# Patient Record
Sex: FEMALE | Race: OTHER | HISPANIC OR LATINO | Employment: FULL TIME | URBAN - METROPOLITAN AREA
[De-identification: names, ages, dates, MRNs, and addresses within clinical notes are randomized per-mention and may not be internally consistent; named-entity substitution may affect disease eponyms.]

---
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
## 2018-04-25 ENCOUNTER — HOSPITAL ENCOUNTER (EMERGENCY)
Facility: HOSPITAL | Age: 18
Discharge: HOME | End: 2018-04-25
Payer: COMMERCIAL

## 2018-04-25 VITALS
HEART RATE: 106 BPM | WEIGHT: 210 LBS | SYSTOLIC BLOOD PRESSURE: 136 MMHG | DIASTOLIC BLOOD PRESSURE: 63 MMHG | TEMPERATURE: 98 F | OXYGEN SATURATION: 98 % | BODY MASS INDEX: 32.96 KG/M2 | RESPIRATION RATE: 20 BRPM | HEIGHT: 67 IN

## 2018-04-25 DIAGNOSIS — J02.9 PHARYNGITIS: Primary | ICD-10-CM

## 2018-04-25 LAB — S PYO AG THROAT QL: NEGATIVE

## 2018-04-25 PROCEDURE — 36415 COLL VENOUS BLD VENIPUNCTURE: CPT | Performed by: PHYSICIAN ASSISTANT

## 2018-04-25 PROCEDURE — 87430 STREP A AG IA: CPT | Performed by: PHYSICIAN ASSISTANT

## 2018-04-25 PROCEDURE — 86308 HETEROPHILE ANTIBODY SCREEN: CPT | Performed by: PHYSICIAN ASSISTANT

## 2018-04-25 PROCEDURE — 99283 EMERGENCY DEPT VISIT LOW MDM: CPT

## 2018-04-25 PROCEDURE — 87070 CULTURE OTHR SPECIMN AEROBIC: CPT | Performed by: PHYSICIAN ASSISTANT

## 2018-04-25 RX ORDER — AMOXICILLIN AND CLAVULANATE POTASSIUM 875; 125 MG/1; MG/1
1 TABLET, FILM COATED ORAL 2 TIMES DAILY
COMMUNITY
Start: 2018-04-23 | End: 2018-05-03

## 2018-04-25 RX ADMIN — DEXAMETHASONE SODIUM PHOSPHATE 10 MG: 10 INJECTION, SOLUTION INTRAMUSCULAR; INTRAVENOUS at 14:38

## 2018-04-25 NOTE — ED PROVIDER NOTES
History  Chief Complaint   Patient presents with    Sore Throat     seen at [facility] diagnosed with strep throat, given rc for Augmentin BID x 10 days  Concerned because symptoms arent improving, reports spots to throat, fevers & body aches  Pt  Is 21 y o  Female who was seen at [facility] diagnosed with strep throat placed on Augmentin 2 days ago  States still with pain, swelling, fevers and tonsillar exudate  Notes multiple episodes of strep throat, in the past year  Was recommended to follow up with ENT and has yet too  States she has had mono in the past as well  Denies N/V/D, voice changes, difficulty breathing  Does note very painful swallowing and decreased po intake  Prior to Admission Medications   Prescriptions Last Dose Informant Patient Reported? Taking?   amoxicillin-clavulanate (AUGMENTIN) 875-125 mg per tablet   Yes Yes   Sig: Take 1 tablet by mouth 2 (two) times a day      Facility-Administered Medications: None       Past Medical History:   Diagnosis Date    History of strep sore throat        Past Surgical History:   Procedure Laterality Date    NO PAST SURGERIES         History reviewed  No pertinent family history  I have reviewed and agree with the history as documented  Social History   Substance Use Topics    Smoking status: Light Tobacco Smoker     Types: Cigarettes    Smokeless tobacco: Never Used    Alcohol use Yes      Comment: occasional         Review of Systems   Constitutional: Positive for fever  HENT: Positive for sore throat and trouble swallowing  All other systems reviewed and are negative        Physical Exam  ED Triage Vitals [04/25/18 1328]   Temperature Pulse Respirations Blood Pressure SpO2   98 °F (36 7 °C) (!) 106 20 136/63 98 %      Temp Source Heart Rate Source Patient Position - Orthostatic VS BP Location FiO2 (%)   Temporal -- -- -- --      Pain Score       6           Orthostatic Vital Signs  Vitals:    04/25/18 1328   BP: 136/63   Pulse: (!) 106 Physical Exam   Constitutional: She appears well-developed and well-nourished  HENT:   Head: Normocephalic and atraumatic  Mouth/Throat: Mucous membranes are normal  Oropharyngeal exudate, posterior oropharyngeal edema and posterior oropharyngeal erythema present  Tonsils are 2+ on the right  Tonsils are 2+ on the left  Tonsillar exudate  Cardiovascular: Normal rate, regular rhythm and normal heart sounds  Nursing note and vitals reviewed  ED Medications  Medications - No data to display    Diagnostic Studies  Results Reviewed     None                 No orders to display              Procedures  Procedures       Phone Contacts  ED Phone Contact    ED Course  ED Course                                MDM  Number of Diagnoses or Management Options  Pharyngitis: new and requires workup     Amount and/or Complexity of Data Reviewed  Clinical lab tests: ordered and reviewed      CritCare Time    Disposition  Final diagnoses:   None     ED Disposition     None      Follow-up Information    None       Patient's Medications   Discharge Prescriptions    No medications on file     No discharge procedures on file      ED Provider  Electronically Signed by           Ricardo Monet PA-C  05/03/18 2020

## 2018-04-25 NOTE — DISCHARGE INSTRUCTIONS

## 2018-04-26 LAB — HETEROPH AB SER QL: NEGATIVE

## 2018-04-27 LAB — BACTERIA THROAT CULT: NORMAL

## 2018-07-30 ENCOUNTER — HOSPITAL ENCOUNTER (EMERGENCY)
Facility: HOSPITAL | Age: 18
Discharge: HOME | End: 2018-07-30
Attending: EMERGENCY MEDICINE
Payer: COMMERCIAL

## 2018-07-30 VITALS
SYSTOLIC BLOOD PRESSURE: 120 MMHG | HEART RATE: 63 BPM | WEIGHT: 185 LBS | BODY MASS INDEX: 29.03 KG/M2 | RESPIRATION RATE: 16 BRPM | TEMPERATURE: 97.5 F | HEIGHT: 67 IN | DIASTOLIC BLOOD PRESSURE: 63 MMHG | OXYGEN SATURATION: 99 %

## 2018-07-30 DIAGNOSIS — J02.9 PHARYNGITIS: Primary | ICD-10-CM

## 2018-07-30 LAB — S PYO AG THROAT QL: NEGATIVE

## 2018-07-30 PROCEDURE — 87430 STREP A AG IA: CPT | Performed by: PHYSICIAN ASSISTANT

## 2018-07-30 PROCEDURE — 99283 EMERGENCY DEPT VISIT LOW MDM: CPT

## 2018-07-30 RX ADMIN — DEXAMETHASONE SODIUM PHOSPHATE 10 MG: 10 INJECTION, SOLUTION INTRAMUSCULAR; INTRAVENOUS at 17:16

## 2018-07-30 NOTE — ED PROVIDER NOTES
History  Chief Complaint   Patient presents with    Sore Throat     pt reports sore throat that started yesterday  Pt states she took Amoxicillin 1 capsule yesterday and 1 today, given to her by aunt who had meds leftover  22 yo F who presents today for evaluation of sore throat x 2 days  Symptoms started yesterday  She reports pain with swallowing but she is able to tolerate PO  She denies any associated fevers, nasal congestion, cough, ear pain, abd pain, n/v, rashes  No sick contacts  Reports history of strep throat in the past  She took two doses her aunt's old prescription of amoxicillin  None       Past Medical History:   Diagnosis Date    History of strep sore throat        Past Surgical History:   Procedure Laterality Date    NO PAST SURGERIES         History reviewed  No pertinent family history  I have reviewed and agree with the history as documented  Social History   Substance Use Topics    Smoking status: Light Tobacco Smoker     Types: Cigarettes    Smokeless tobacco: Never Used    Alcohol use Yes      Comment: occasional         Review of Systems   Constitutional: Negative for chills and fever  HENT: Positive for sore throat  Negative for congestion, ear discharge, ear pain, rhinorrhea and trouble swallowing  Respiratory: Negative for cough and shortness of breath  Cardiovascular: Negative for chest pain  Gastrointestinal: Negative for abdominal pain, diarrhea, nausea and vomiting  Skin: Negative for rash  Physical Exam  Physical Exam   Constitutional: She is oriented to person, place, and time  Vital signs are normal  She appears well-developed and well-nourished  Non-toxic appearance  She does not have a sickly appearance  She does not appear ill  No distress  Normal phonation  Well appearing   HENT:   Head: Normocephalic and atraumatic     Right Ear: Hearing, tympanic membrane, external ear and ear canal normal    Left Ear: Hearing, tympanic NEUROLOGY PROGRESS NOTE      Background:  34 y.o. male was admitted on 6/17/2017  7:07 AM for Aspiration pneumonia (CMS-HCC)  Aspiration pneumonia (CMS-HCC).  He is being followed by Neurology for status epilepticus.    SUBJECTIVE: Intubated, sedated. No clinical Sz activity.  No Sz reported on last 24 hr EEG by Dr. Bolton.    NEUROLOGICAL EXAM:   NEUROLOGIC:  Limited as the patient is not interactive.  Apparently at    baseline, he just track with his eyes.  He is nonverbal, with a spastic    quadriparesis.    OBJECTIVE:     EEG:   (6/19)  This is an abnormal video EEG recording in the awake and drowsy state(s).  Frequent interictal / independent right and left temporal spikes. FOUR seizures captured during the study. All seizures were several seconds long, some involved secondary generalization. Two seizures appear to have a right temporal and two others a left temporal onset. There is no recovery of mentation (unknown baseline) in between events, in that sense, the patient should be considered in status epilepticus. 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 Clinical and radiological correlation is recommended.    EEG(6/22):  This is an abnormal 24 hrs video EEG recording in the awake and drowsy state(s).  Frequent interictal / independent right and left temporal spikes. The findings increase risk for seizures and suggest underlying areas of cortical irritability, however no seizures were captured during this study. There has been significant improvement when compared to prior studies. Clinical and radiological correlation is recommended.      Harris Bolton MD      MEDICATIONS:  Current Facility-Administered Medications   Medication Dose   • sodium phosphate 15 mmol in 1/2  mL ivpb  15 mmol   • potassium chloride (KLOR-CON) 20 MEQ packet 40 mEq  40 mEq   • cefTRIAXone (ROCEPHIN) 2 g in  mL IVPB  2 g   • metronidazole (FLAGYL) tablet 500 mg  500 mg   • norepinephrine (LEVOPHED) 8 mg in  mL Infusion  0.5-30  mcg/min   • levetiracetam (KEPPRA) 1,500 mg in D5W 100 mL IVPB  1,500 mg   • PHENobarbital solution 60 mg  60 mg   • enoxaparin (LOVENOX) inj 30 mg  30 mg   • 0.9 % NaCl with KCl 20 mEq infusion     • Respiratory Care per Protocol     • senna-docusate (PERICOLACE or SENOKOT S) 8.6-50 MG per tablet 2 Tab  2 Tab    And   • polyethylene glycol/lytes (MIRALAX) PACKET 1 Packet  1 Packet    And   • magnesium hydroxide (MILK OF MAGNESIA) suspension 30 mL  30 mL    And   • bisacodyl (DULCOLAX) suppository 10 mg  10 mg   • chlorhexidine (PERIDEX) 0.12 % solution 15 mL  15 mL   • lidocaine (XYLOCAINE) 1%  injection  1-2 mL   • MD ALERT...Adult ICU Electrolyte Replacement per Pharmacy Protocol     • fentaNYL (SUBLIMAZE) injection 25 mcg  25 mcg    Or   • fentaNYL (SUBLIMAZE) injection 50 mcg  50 mcg    Or   • fentaNYL (SUBLIMAZE) injection 100 mcg  100 mcg   • famotidine (PEPCID) tablet 20 mg  20 mg    Or   • famotidine (PEPCID) injection 20 mg  20 mg   • ipratropium-albuterol (DUONEB) nebulizer solution 3 mL  3 mL   • ipratropium-albuterol (DUONEB) nebulizer solution 3 mL  3 mL   • insulin lispro (HUMALOG) injection 1-6 Units  1-6 Units   • glucose 4 g chewable tablet 16 g  16 g    And   • dextrose 50% (D50W) injection 25 mL  25 mL   • propofol (DIPRIVAN) injection  5-80 mcg/kg/min   • NS infusion 1,632 mL  30 mL/kg   • NS (BOLUS) infusion 1,000 mL  1,000 mL   • ondansetron (ZOFRAN) syringe/vial injection 4 mg  4 mg   • ondansetron (ZOFRAN ODT) dispertab 4 mg  4 mg   • promethazine (PHENERGAN) tablet 12.5-25 mg  12.5-25 mg   • promethazine (PHENERGAN) suppository 12.5-25 mg  12.5-25 mg   • prochlorperazine (COMPAZINE) injection 5-10 mg  5-10 mg   • lorazepam (ATIVAN) tablet 0.5 mg  0.5 mg    Or   • lorazepam (ATIVAN) injection 0.5 mg  0.5 mg   • acetaminophen (TYLENOL) tablet 650 mg  650 mg   • Pain Pump (patient supplied) Device         Blood pressure 128/98, pulse 84, temperature 36.9 °C (98.4 °F), resp. rate 14, height  membrane, external ear and ear canal normal    Nose: Nose normal    Mouth/Throat: Uvula is midline, oropharynx is clear and moist and mucous membranes are normal  No posterior oropharyngeal edema, posterior oropharyngeal erythema or tonsillar abscesses  Tonsils are 2+ on the right  Tonsils are 2+ on the left  No tonsillar exudate  Eyes: Conjunctivae and EOM are normal  Pupils are equal, round, and reactive to light  Neck: Normal range of motion  Neck supple  Cardiovascular: Normal rate, regular rhythm and normal heart sounds  Exam reveals no gallop and no friction rub  No murmur heard  Pulmonary/Chest: Effort normal and breath sounds normal  No respiratory distress  She has no decreased breath sounds  She has no wheezes  She has no rhonchi  She has no rales  Musculoskeletal: Normal range of motion  Lymphadenopathy:     She has no cervical adenopathy  Neurological: She is alert and oriented to person, place, and time  Skin: Skin is warm and dry  Capillary refill takes less than 2 seconds  She is not diaphoretic  Psychiatric: She has a normal mood and affect  Nursing note and vitals reviewed        Vital Signs  ED Triage Vitals [07/30/18 1617]   Temperature Pulse Respirations Blood Pressure SpO2   97 5 °F (36 4 °C) 63 16 120/63 99 %      Temp Source Heart Rate Source Patient Position - Orthostatic VS BP Location FiO2 (%)   Oral Monitor Sitting Right arm --      Pain Score       6           Vitals:    07/30/18 1617   BP: 120/63   Pulse: 63   Patient Position - Orthostatic VS: Sitting       Visual Acuity      ED Medications  Medications   dexamethasone 10 mg/mL oral liquid 10 mg 1 mL (not administered)       Diagnostic Studies  Results Reviewed     Procedure Component Value Units Date/Time    Rapid Strep A Screen Only, Adults [[id number]]  (Normal) Collected:  07/30/18 1649    Lab Status:  Final result Specimen:  Throat from Throat Updated:  07/30/18 1703     Rapid Strep A Screen Negative "1.727 m (5' 7.99\"), weight 60.4 kg (133 lb 2.5 oz), SpO2 97 %.        Recent Labs      06/20/17   0530 06/21/17 0420 06/22/17   0533   WBC  5.7  6.0  8.5   RBC  3.28*  3.47*  3.60*   HEMOGLOBIN  9.5*  10.1*  10.4*   HEMATOCRIT  27.8*  29.1*  30.3*   MCV  84.8  83.9  84.2   MCH  29.0  29.1  28.9   MCHC  34.2  34.7  34.3   RDW  47.8  49.2  49.9   PLATELETCT  144*  170  203   MPV  9.0  9.1  9.6     Recent Labs      06/20/17 0530 06/21/17 0420 06/22/17   0533   SODIUM  128*  134*  138   POTASSIUM  2.7*  2.9*  4.1   CHLORIDE  98  104  109   CO2  22  24  24   GLUCOSE  74  122*  111*   BUN  7*  4*  9       No results found for this or any previous visit.     ASSESSMENT AND PLAN:   A 34-year-old male with status epilepticus. Currently intubated.  No Sz on EEG monitoring.  Will d/c prolonged EEG monitoring.  Con't with Keppra 1500mg BID and  Phenobarb 60md BID.  When possible proceed with SBT and extubation.      " No orders to display              Procedures  Procedures       Phone Contacts  ED Phone Contact    ED Course                               MDM  Number of Diagnoses or Management Options  Pharyngitis: new and requires workup  Diagnosis management comments:   22 yo F c/o sore throat  VSS  Rapid strep negative  She was given decadron in the ED  Advised motrin/tylenol for pain, fluids  Patient was instructed not to take old leftover antibiotics  Advised f/u with PCP and ENT as patient reports history of frequent strep throat  Amount and/or Complexity of Data Reviewed  Clinical lab tests: ordered and reviewed    Patient Progress  Patient progress: stable    CritCare Time    Disposition  Final diagnoses:   Pharyngitis     Time reflects when diagnosis was documented in both MDM as applicable and the Disposition within this note     Time User Action Codes Description Comment    7/30/2018  5:07 PM Bryan Flurry Add [J02 9] Pharyngitis       ED Disposition     ED Disposition Condition Comment    Discharge  [address] [facility] discharge to home/self care  Condition at discharge: Good        Follow-up Information     Follow up With Specialties Details Why Contact Info Additional [address] [facility] Internal Medicine Schedule an appointment as soon as possible for a visit  [facility] [id number]  [address],  Otolaryngology   [address] Emergency Department Emergency Medicine  If symptoms worsen [address] [facility]  [phone] [place] ED, [facility] [facility] , [place], [place], [address]          Patient's Medications    No medications on file     No discharge procedures on file      ED Provider  Electronically Signed by           Liv Huff PA-C  07/30/18 3078

## 2018-07-30 NOTE — DISCHARGE INSTRUCTIONS

## 2018-09-14 ENCOUNTER — ANESTHESIA EVENT (EMERGENCY)
Dept: PERIOP | Facility: HOSPITAL | Age: 18
End: 2018-09-14
Payer: COMMERCIAL

## 2018-09-14 ENCOUNTER — APPOINTMENT (EMERGENCY)
Dept: CT IMAGING | Facility: HOSPITAL | Age: 18
End: 2018-09-14
Payer: COMMERCIAL

## 2018-09-14 ENCOUNTER — APPOINTMENT (EMERGENCY)
Dept: RADIOLOGY | Facility: HOSPITAL | Age: 18
End: 2018-09-14
Payer: COMMERCIAL

## 2018-09-14 ENCOUNTER — HOSPITAL ENCOUNTER (EMERGENCY)
Facility: HOSPITAL | Age: 18
Discharge: HOME | End: 2018-09-14
Attending: EMERGENCY MEDICINE | Admitting: SURGERY
Payer: COMMERCIAL

## 2018-09-14 ENCOUNTER — APPOINTMENT (EMERGENCY)
Dept: ULTRASOUND IMAGING | Facility: HOSPITAL | Age: 18
End: 2018-09-14
Payer: COMMERCIAL

## 2018-09-14 ENCOUNTER — ANESTHESIA (EMERGENCY)
Dept: PERIOP | Facility: HOSPITAL | Age: 18
End: 2018-09-14
Payer: COMMERCIAL

## 2018-09-14 VITALS
HEART RATE: 68 BPM | BODY MASS INDEX: 29.07 KG/M2 | DIASTOLIC BLOOD PRESSURE: 70 MMHG | WEIGHT: 185.19 LBS | RESPIRATION RATE: 18 BRPM | HEIGHT: 67 IN | TEMPERATURE: 98.3 F | SYSTOLIC BLOOD PRESSURE: 126 MMHG | OXYGEN SATURATION: 100 %

## 2018-09-14 DIAGNOSIS — K80.12 CALCULUS OF GALLBLADDER WITH ACUTE AND CHRONIC CHOLECYSTITIS WITHOUT OBSTRUCTION: Primary | ICD-10-CM

## 2018-09-14 DIAGNOSIS — R10.11 RUQ PAIN: ICD-10-CM

## 2018-09-14 DIAGNOSIS — J02.9 PHARYNGITIS: ICD-10-CM

## 2018-09-14 DIAGNOSIS — K80.20 CHOLELITHIASIS: ICD-10-CM

## 2018-09-14 DIAGNOSIS — N39.0 UTI (URINARY TRACT INFECTION): ICD-10-CM

## 2018-09-14 LAB
ALBUMIN SERPL BCP-MCNC: 3.6 G/DL (ref 3.5–5)
ALP SERPL-CCNC: 55 U/L (ref 46–116)
ALT SERPL W P-5'-P-CCNC: 19 U/L (ref 12–78)
ANION GAP SERPL CALCULATED.3IONS-SCNC: 9 MMOL/L (ref 4–13)
AST SERPL W P-5'-P-CCNC: 13 U/L (ref 5–45)
BACTERIA UR QL AUTO: ABNORMAL /HPF
BASOPHILS # BLD AUTO: 0.05 THOUSANDS/ΜL (ref 0–0.1)
BASOPHILS NFR BLD AUTO: 1 % (ref 0–1)
BILIRUB SERPL-MCNC: 0.33 MG/DL (ref 0.2–1)
BILIRUB UR QL STRIP: NEGATIVE
BUN SERPL-MCNC: 13 MG/DL (ref 5–25)
CALCIUM SERPL-MCNC: 8.7 MG/DL (ref 8.3–10.1)
CHLORIDE SERPL-SCNC: 104 MMOL/L (ref 100–108)
CLARITY UR: CLEAR
CO2 SERPL-SCNC: 25 MMOL/L (ref 21–32)
COLOR UR: YELLOW
CREAT SERPL-MCNC: 0.77 MG/DL (ref 0.6–1.3)
EOSINOPHIL # BLD AUTO: 0.1 THOUSAND/ΜL (ref 0–0.61)
EOSINOPHIL NFR BLD AUTO: 1 % (ref 0–6)
ERYTHROCYTE [DISTWIDTH] IN BLOOD BY AUTOMATED COUNT: 12 % (ref 11.6–15.1)
EXT PREG TEST URINE: NORMAL
GFR SERPL CREATININE-BSD FRML MDRD: 112 ML/MIN/1.73SQ M
GLUCOSE SERPL-MCNC: 92 MG/DL (ref 65–140)
GLUCOSE UR STRIP-MCNC: NEGATIVE MG/DL
HCT VFR BLD AUTO: 40.1 % (ref 34.8–46.1)
HGB BLD-MCNC: 13.8 G/DL (ref 11.5–15.4)
HGB UR QL STRIP.AUTO: ABNORMAL
IMM GRANULOCYTES # BLD AUTO: 0.03 THOUSAND/UL (ref 0–0.2)
IMM GRANULOCYTES NFR BLD AUTO: 0 % (ref 0–2)
KETONES UR STRIP-MCNC: NEGATIVE MG/DL
LEUKOCYTE ESTERASE UR QL STRIP: ABNORMAL
LIPASE SERPL-CCNC: 163 U/L (ref 73–393)
LYMPHOCYTES # BLD AUTO: 1.38 THOUSANDS/ΜL (ref 0.6–4.47)
LYMPHOCYTES NFR BLD AUTO: 17 % (ref 14–44)
MCH RBC QN AUTO: 30.5 PG (ref 26.8–34.3)
MCHC RBC AUTO-ENTMCNC: 34.4 G/DL (ref 31.4–37.4)
MCV RBC AUTO: 89 FL (ref 82–98)
MONOCYTES # BLD AUTO: 0.55 THOUSAND/ΜL (ref 0.17–1.22)
MONOCYTES NFR BLD AUTO: 7 % (ref 4–12)
NEUTROPHILS # BLD AUTO: 6.13 THOUSANDS/ΜL (ref 1.85–7.62)
NEUTS SEG NFR BLD AUTO: 74 % (ref 43–75)
NITRITE UR QL STRIP: NEGATIVE
NON-SQ EPI CELLS URNS QL MICRO: ABNORMAL /HPF
NRBC BLD AUTO-RTO: 0 /100 WBCS
PH UR STRIP.AUTO: 6.5 [PH] (ref 4.5–8)
PLATELET # BLD AUTO: 209 THOUSANDS/UL (ref 149–390)
PMV BLD AUTO: 10.3 FL (ref 8.9–12.7)
POTASSIUM SERPL-SCNC: 3.7 MMOL/L (ref 3.5–5.3)
PROT SERPL-MCNC: 7.4 G/DL (ref 6.4–8.2)
PROT UR STRIP-MCNC: NEGATIVE MG/DL
RBC # BLD AUTO: 4.53 MILLION/UL (ref 3.81–5.12)
RBC #/AREA URNS AUTO: ABNORMAL /HPF
SODIUM SERPL-SCNC: 138 MMOL/L (ref 136–145)
SP GR UR STRIP.AUTO: 1.02 (ref 1–1.03)
UROBILINOGEN UR QL STRIP.AUTO: 1 E.U./DL
WBC # BLD AUTO: 8.24 THOUSAND/UL (ref 4.31–10.16)
WBC #/AREA URNS AUTO: ABNORMAL /HPF

## 2018-09-14 PROCEDURE — 85025 COMPLETE CBC W/AUTO DIFF WBC: CPT | Performed by: PHYSICIAN ASSISTANT

## 2018-09-14 PROCEDURE — 81025 URINE PREGNANCY TEST: CPT | Performed by: EMERGENCY MEDICINE

## 2018-09-14 PROCEDURE — 88304 TISSUE EXAM BY PATHOLOGIST: CPT | Performed by: PATHOLOGY

## 2018-09-14 PROCEDURE — 80053 COMPREHEN METABOLIC PANEL: CPT | Performed by: PHYSICIAN ASSISTANT

## 2018-09-14 PROCEDURE — 76705 ECHO EXAM OF ABDOMEN: CPT

## 2018-09-14 PROCEDURE — 47563 LAPARO CHOLECYSTECTOMY/GRAPH: CPT | Performed by: PHYSICIAN ASSISTANT

## 2018-09-14 PROCEDURE — 81001 URINALYSIS AUTO W/SCOPE: CPT

## 2018-09-14 PROCEDURE — 99285 EMERGENCY DEPT VISIT HI MDM: CPT

## 2018-09-14 PROCEDURE — 96360 HYDRATION IV INFUSION INIT: CPT

## 2018-09-14 PROCEDURE — 83690 ASSAY OF LIPASE: CPT | Performed by: PHYSICIAN ASSISTANT

## 2018-09-14 PROCEDURE — 74300 X-RAY BILE DUCTS/PANCREAS: CPT

## 2018-09-14 PROCEDURE — 74177 CT ABD & PELVIS W/CONTRAST: CPT

## 2018-09-14 PROCEDURE — 36415 COLL VENOUS BLD VENIPUNCTURE: CPT | Performed by: PHYSICIAN ASSISTANT

## 2018-09-14 RX ORDER — DEXAMETHASONE SODIUM PHOSPHATE 4 MG/ML
INJECTION, SOLUTION INTRA-ARTICULAR; INTRALESIONAL; INTRAMUSCULAR; INTRAVENOUS; SOFT TISSUE AS NEEDED
Status: DISCONTINUED | OUTPATIENT
Start: 2018-09-14 | End: 2018-09-14 | Stop reason: SURG

## 2018-09-14 RX ORDER — OXYCODONE HYDROCHLORIDE AND ACETAMINOPHEN 5; 325 MG/1; MG/1
1 TABLET ORAL EVERY 4 HOURS PRN
Qty: 15 TABLET | Refills: 0 | Status: SHIPPED | OUTPATIENT
Start: 2018-09-14 | End: 2018-09-24

## 2018-09-14 RX ORDER — ROCURONIUM BROMIDE 10 MG/ML
INJECTION, SOLUTION INTRAVENOUS AS NEEDED
Status: DISCONTINUED | OUTPATIENT
Start: 2018-09-14 | End: 2018-09-14 | Stop reason: SURG

## 2018-09-14 RX ORDER — GLYCOPYRROLATE 0.2 MG/ML
INJECTION INTRAMUSCULAR; INTRAVENOUS AS NEEDED
Status: DISCONTINUED | OUTPATIENT
Start: 2018-09-14 | End: 2018-09-14 | Stop reason: SURG

## 2018-09-14 RX ORDER — PROMETHAZINE HYDROCHLORIDE 25 MG/ML
12.5 INJECTION, SOLUTION INTRAMUSCULAR; INTRAVENOUS EVERY 4 HOURS PRN
Status: DISCONTINUED | OUTPATIENT
Start: 2018-09-14 | End: 2018-09-14 | Stop reason: HOSPADM

## 2018-09-14 RX ORDER — FENTANYL CITRATE 50 UG/ML
INJECTION, SOLUTION INTRAMUSCULAR; INTRAVENOUS AS NEEDED
Status: DISCONTINUED | OUTPATIENT
Start: 2018-09-14 | End: 2018-09-14 | Stop reason: SURG

## 2018-09-14 RX ORDER — BUPIVACAINE HYDROCHLORIDE 5 MG/ML
INJECTION, SOLUTION PERINEURAL AS NEEDED
Status: DISCONTINUED | OUTPATIENT
Start: 2018-09-14 | End: 2018-09-14 | Stop reason: HOSPADM

## 2018-09-14 RX ORDER — OXYCODONE HYDROCHLORIDE AND ACETAMINOPHEN 5; 325 MG/1; MG/1
2 TABLET ORAL EVERY 4 HOURS PRN
Status: DISCONTINUED | OUTPATIENT
Start: 2018-09-14 | End: 2018-09-14 | Stop reason: HOSPADM

## 2018-09-14 RX ORDER — NICOTINE 21 MG/24HR
21 PATCH, TRANSDERMAL 24 HOURS TRANSDERMAL DAILY
Status: DISCONTINUED | OUTPATIENT
Start: 2018-09-14 | End: 2018-09-14 | Stop reason: HOSPADM

## 2018-09-14 RX ORDER — SODIUM CHLORIDE 9 MG/ML
125 INJECTION, SOLUTION INTRAVENOUS CONTINUOUS
Status: DISCONTINUED | OUTPATIENT
Start: 2018-09-14 | End: 2018-09-14 | Stop reason: HOSPADM

## 2018-09-14 RX ORDER — OXYCODONE AND ACETAMINOPHEN 2.5; 325 MG/1; MG/1
1 TABLET ORAL EVERY 4 HOURS PRN
Qty: 15 TABLET | Refills: 0 | Status: SHIPPED | OUTPATIENT
Start: 2018-09-14 | End: 2018-09-14 | Stop reason: HOSPADM

## 2018-09-14 RX ORDER — CEPHALEXIN 250 MG/1
500 CAPSULE ORAL ONCE
Status: COMPLETED | OUTPATIENT
Start: 2018-09-14 | End: 2018-09-14

## 2018-09-14 RX ORDER — ONDANSETRON 2 MG/ML
4 INJECTION INTRAMUSCULAR; INTRAVENOUS EVERY 6 HOURS PRN
Status: DISCONTINUED | OUTPATIENT
Start: 2018-09-14 | End: 2018-09-14 | Stop reason: HOSPADM

## 2018-09-14 RX ORDER — MIDAZOLAM HYDROCHLORIDE 1 MG/ML
INJECTION INTRAMUSCULAR; INTRAVENOUS AS NEEDED
Status: DISCONTINUED | OUTPATIENT
Start: 2018-09-14 | End: 2018-09-14 | Stop reason: SURG

## 2018-09-14 RX ORDER — ONDANSETRON 2 MG/ML
INJECTION INTRAMUSCULAR; INTRAVENOUS AS NEEDED
Status: DISCONTINUED | OUTPATIENT
Start: 2018-09-14 | End: 2018-09-14 | Stop reason: SURG

## 2018-09-14 RX ORDER — MAGNESIUM HYDROXIDE/ALUMINUM HYDROXICE/SIMETHICONE 120; 1200; 1200 MG/30ML; MG/30ML; MG/30ML
15 SUSPENSION ORAL ONCE
Status: COMPLETED | OUTPATIENT
Start: 2018-09-14 | End: 2018-09-14

## 2018-09-14 RX ORDER — PROPOFOL 10 MG/ML
INJECTION, EMULSION INTRAVENOUS AS NEEDED
Status: DISCONTINUED | OUTPATIENT
Start: 2018-09-14 | End: 2018-09-14 | Stop reason: SURG

## 2018-09-14 RX ORDER — FENTANYL CITRATE 50 UG/ML
50 INJECTION, SOLUTION INTRAMUSCULAR; INTRAVENOUS
Status: COMPLETED | OUTPATIENT
Start: 2018-09-14 | End: 2018-09-14

## 2018-09-14 RX ORDER — SUCCINYLCHOLINE CHLORIDE 20 MG/ML
INJECTION INTRAMUSCULAR; INTRAVENOUS AS NEEDED
Status: DISCONTINUED | OUTPATIENT
Start: 2018-09-14 | End: 2018-09-14 | Stop reason: SURG

## 2018-09-14 RX ADMIN — DEXAMETHASONE SODIUM PHOSPHATE 4 MG: 4 INJECTION, SOLUTION INTRAMUSCULAR; INTRAVENOUS at 12:56

## 2018-09-14 RX ADMIN — FENTANYL CITRATE 50 MCG: 50 INJECTION, SOLUTION INTRAMUSCULAR; INTRAVENOUS at 13:02

## 2018-09-14 RX ADMIN — SODIUM CHLORIDE 1000 ML: 0.9 INJECTION, SOLUTION INTRAVENOUS at 07:57

## 2018-09-14 RX ADMIN — ALUMINUM HYDROXIDE, MAGNESIUM HYDROXIDE, AND SIMETHICONE 15 ML: 200; 200; 20 SUSPENSION ORAL at 08:03

## 2018-09-14 RX ADMIN — FENTANYL CITRATE 50 MCG: 50 INJECTION INTRAMUSCULAR; INTRAVENOUS at 14:10

## 2018-09-14 RX ADMIN — IOHEXOL 100 ML: 350 INJECTION, SOLUTION INTRAVENOUS at 08:46

## 2018-09-14 RX ADMIN — MIDAZOLAM 2 MG: 1 INJECTION INTRAMUSCULAR; INTRAVENOUS at 12:32

## 2018-09-14 RX ADMIN — ROCURONIUM BROMIDE 25 MG: 10 INJECTION INTRAVENOUS at 12:36

## 2018-09-14 RX ADMIN — LIDOCAINE HYDROCHLORIDE 15 ML: 20 SOLUTION ORAL; TOPICAL at 08:03

## 2018-09-14 RX ADMIN — PROPOFOL 200 MG: 10 INJECTION, EMULSION INTRAVENOUS at 12:35

## 2018-09-14 RX ADMIN — FENTANYL CITRATE 50 MCG: 50 INJECTION INTRAMUSCULAR; INTRAVENOUS at 14:20

## 2018-09-14 RX ADMIN — SODIUM CHLORIDE 125 ML/HR: 0.9 INJECTION, SOLUTION INTRAVENOUS at 12:18

## 2018-09-14 RX ADMIN — NEOSTIGMINE METHYLSULFATE 3 MG: 1 INJECTION, SOLUTION INTRAMUSCULAR; INTRAVENOUS; SUBCUTANEOUS at 13:28

## 2018-09-14 RX ADMIN — CEPHALEXIN 500 MG: 250 CAPSULE ORAL at 10:44

## 2018-09-14 RX ADMIN — SODIUM CHLORIDE: 0.9 INJECTION, SOLUTION INTRAVENOUS at 12:27

## 2018-09-14 RX ADMIN — GLYCOPYRROLATE 0.4 MG: 0.2 INJECTION, SOLUTION INTRAMUSCULAR; INTRAVENOUS at 13:28

## 2018-09-14 RX ADMIN — FENTANYL CITRATE 50 MCG: 50 INJECTION INTRAMUSCULAR; INTRAVENOUS at 14:00

## 2018-09-14 RX ADMIN — CEFAZOLIN SODIUM 2000 MG: 2 SOLUTION INTRAVENOUS at 12:37

## 2018-09-14 RX ADMIN — FENTANYL CITRATE 100 MCG: 50 INJECTION, SOLUTION INTRAMUSCULAR; INTRAVENOUS at 12:35

## 2018-09-14 RX ADMIN — ONDANSETRON HYDROCHLORIDE 4 MG: 2 INJECTION, SOLUTION INTRAVENOUS at 12:56

## 2018-09-14 RX ADMIN — FENTANYL CITRATE 50 MCG: 50 INJECTION INTRAMUSCULAR; INTRAVENOUS at 13:50

## 2018-09-14 RX ADMIN — FENTANYL CITRATE 50 MCG: 50 INJECTION, SOLUTION INTRAMUSCULAR; INTRAVENOUS at 13:19

## 2018-09-14 RX ADMIN — ROCURONIUM BROMIDE 10 MG: 10 INJECTION INTRAVENOUS at 12:55

## 2018-09-14 RX ADMIN — SODIUM CHLORIDE: 0.9 INJECTION, SOLUTION INTRAVENOUS at 13:39

## 2018-09-14 RX ADMIN — OXYCODONE AND ACETAMINOPHEN 2 TABLET: 5; 325 TABLET ORAL at 15:23

## 2018-09-14 RX ADMIN — SUCCINYLCHOLINE CHLORIDE 5 MG: 20 INJECTION, SOLUTION INTRAMUSCULAR; INTRAVENOUS at 12:34

## 2018-09-14 NOTE — ED NOTES
Prescription for Keflex called into [facility] on [address] [facility] TERESSA Shah  09/14/18 6273

## 2018-09-14 NOTE — DISCHARGE INSTRUCTIONS
[facility] discharge instructions    1  No driving 1 week     2  No strenuous activity for 2 weeks  3   If you have a dressing on your skin, remove it in 2 days and then you may shower over the wound with soap and water  4   Notify the office for development of fevers, chills, worsening pain, loss of appetite  5   Call [phone] with any questions or concerns  Cholecystitis   WHAT YOU SHOULD KNOW:   Cholecystitis is inflammation of your gallbladder  Your gallbladder stores bile, which helps break down the fat that you eat  It also helps remove certain chemicals from your body  You may have a sudden, severe attack (acute cholecystitis) or several mild attacks (chronic cholecystitis)  AFTER YOU LEAVE:   Medicines:   · Pain medicine: You may need medicine to take away or decrease pain  ¨ Learn how to take your medicine  Ask what medicine and how much you should take  Be sure you know how, when, and how often to take it  ¨ Do not wait until the pain is severe before you take your medicine  Tell caregivers if your pain does not decrease  ¨ Pain medicine can make you dizzy or sleepy  Prevent falls by calling someone when you get out of bed or if you need help  · NSAIDs  help decrease swelling and pain or fever  This medicine is available with or without a doctor's order  NSAIDs can cause stomach bleeding or kidney problems in certain people  If you take blood thinner medicine, always ask your healthcare provider if NSAIDs are safe for you  Always read the medicine label and follow directions  · Take your medicine as directed  Call your healthcare provider if you think your medicine is not helping or if you have side effects  Tell him if you are allergic to any medicine  Keep a list of the medicines, vitamins, and herbs you take  Include the amounts, and when and why you take them  Bring the list or the pill bottles to follow-up visits   Carry your medicine list with you in case of an emergency  Follow up with your healthcare provider as directed:  Write down your questions so you remember to ask them during your visits  Eat a variety of healthy foods: This may help you have more energy and heal faster  Healthy foods include fruit, vegetables, whole-grain breads, low-fat dairy products, beans, lean meat, and fish  Ask if you need to be on a special diet  Contact your healthcare provider if:   · You have a fever or chills  · You have nausea or vomiting  · You have decreased appetite  · You have pain when you urinate  · Your skin or eyes turn yellow  · You have questions or concerns about your condition or care  Seek care immediately or call 911 if:   · You have severe pain in your abdomen  · You have chest pain or trouble breathing  · You urinate less than usual   © 2014 [address] is for End User's use only and may not be sold, redistributed or otherwise used for commercial purposes  All illustrations and images included in CareNotes® are the copyrighted property of A D [employer] M , [employer]  or [employer]  The above information is an  only  It is not intended as medical advice for individual conditions or treatments  Talk to your doctor, nurse or pharmacist before following any medical regimen to see if it is safe and effective for you  Oxycodone/Acetaminophen (By mouth)   Acetaminophen (d-sqdg-j-MIN-oh-fen), Oxycodone Hydrochloride (gg-s-MUW-done alexandr-droe-KLOR-mono)  Treats moderate to moderately severe pain  This medicine is a narcotic pain reliever  Brand Name(s): Endocet, Percocet, Primlev, Xartemis XR   There may be other brand names for this medicine  When This Medicine Should Not Be Used: This medicine is not right for everyone  Do not use it if you had an allergic reaction to acetaminophen or oxycodone, or if you have serious breathing problems or paralytic ileus    How to Use This Medicine:   Capsule, Liquid, Tablet, Long Acting Tablet  · Your doctor will tell you how much medicine to use  Do not use more than directed  · An overdose can be dangerous  Follow directions carefully so you do not get too much medicine at one time  · Oral liquid: Measure the oral liquid medicine with a marked measuring spoon, oral syringe, or medicine cup  · Swallow the extended-release tablet whole  Do not crush, break, or chew it  Do not lick or wet the tablet before placing it in your mouth  Do not give this medicine through a feeding tube  · This medicine should come with a Medication Guide  Ask your pharmacist for a copy if you do not have one  · Missed dose: If you miss a dose of this medicine, skip the missed dose and go back to your regular dosing schedule  Do not double doses  · Store the medicine in a closed container at room temperature, away from heat, moisture, and direct light  Ask your pharmacist about the best way to dispose of medicine you do not use  Drugs and Foods to Avoid:   Ask your doctor or pharmacist before using any other medicine, including over-the-counter medicines, vitamins, and herbal products  · Do not use Xartemis XR if you are using or have used an MAO inhibitor in the past 14 days  · Some medicines can affect how this medicine works  Tell your doctor if you are using any of the following:   ¨ Carbamazepine, erythromycin, ketoconazole, lamotrigine, mirtazapine, naltrexone, phenytoin, propranolol, rifampin, ritonavir, tramadol, trazodone, or zidovudine  ¨ Birth control pills  ¨ Diuretic (water pill)  ¨ Medicine to treat depression  ¨ Phenothiazine medicine  ¨ Triptan medicine to treat migraine headaches  · Do not drink alcohol while you are using this medicine  Acetaminophen can damage your liver, and alcohol can increase this risk  Do not take acetaminophen without asking your doctor if you have 3 or more drinks of alcohol every day    · Tell your doctor if you use anything else that makes you sleepy  Some examples are allergy medicine, narcotic pain medicine, and alcohol  Tell your doctor if you are using buprenorphine, butorphanol, nalbuphine, pentazocine, a benzodiazepine, or a muscle relaxer  Warnings While Using This Medicine:   · Tell your doctor if you are pregnant or breastfeeding, or if you have kidney disease, liver disease, heart disease, low blood pressure, breathing problems or lung disease (such as asthma, COPD), thyroid problems, [place] disease, pancreas or gallbladder problems, prostate problems, trouble urinating, or a stomach problems, or a history of head injury or brain damage, seizures, or alcohol or drug abuse  Tell your doctor if you are allergic to codeine  · This medicine may cause the following problems:  ¨ High risk of overdose, which can lead to death  ¨ Respiratory depression (serious breathing problem that can be life-threatening)  ¨ Liver problems  ¨ Serious skin reactions  ¨ Serotonin syndrome (when used with certain medicines)  · This medicine may make you dizzy or drowsy  Do not drive or do anything that could be dangerous until you know how this medicine affects you  Sit or lie down if you feel dizzy  Stand up carefully  · This medicine contains acetaminophen  Read the labels of all other medicines you are using to see if they also contain acetaminophen, or ask your doctor or pharmacist  Mike Mcburney not use more than 4 grams (4,000 milligrams) total of acetaminophen in one day  · This medicine can be habit-forming  Do not use more than your prescribed dose  Call your doctor if you think your medicine is not working  · Do not stop using this medicine suddenly  Your doctor will need to slowly decrease your dose before you stop it completely  · This medicine could cause infertility  Talk with your doctor before using this medicine if you plan to have children  · This medicine may cause constipation, especially with long-term use   Ask your doctor if you should use a laxative to prevent and treat constipation  · Keep all medicine out of the reach of children  Never share your medicine with anyone  Possible Side Effects While Using This Medicine:   Call your doctor right away if you notice any of these side effects:  · Allergic reaction: Itching or hives, swelling in your face or hands, swelling or tingling in your mouth or throat, chest tightness, trouble breathing  · Anxiety, restlessness, fast heartbeat, fever, muscle spasms, twitching, diarrhea, seeing or hearing things that are not there  · Blistering, peeling, red skin rash  · Blue lips, fingernails, or skin  · Dark urine or pale stools, loss of appetite, stomach pain, yellow skin or eyes  · Extreme weakness, shallow breathing, uneven heartbeat, seizures, sweating, or cold or clammy skin  · Severe confusion, lightheadedness, dizziness, or fainting  · Severe constipation, nausea, or vomiting  · Trouble breathing or slow breathing  If you notice these less serious side effects, talk with your doctor:   · Headache  · Mild constipation, nausea, or vomiting  · Mild sleepiness or drowsiness  If you notice other side effects that you think are caused by this medicine, tell your doctor  Call your doctor for medical advice about side effects  You may report side effects to FDA at 4-026-FDA-3186  © 2017 [address] Information is for End User's use only and may not be sold, redistributed or otherwise used for commercial purposes  The above information is an  only  It is not intended as medical advice for individual conditions or treatments  Talk to your doctor, nurse or pharmacist before following any medical regimen to see if it is safe and effective for you  Cigarette Smoking and Your Health   WHAT YOU NEED TO KNOW:   What are the risks to my health if I smoke tobacco?  Nicotine and other chemicals found in tobacco damage every cell in your body   Even if you are a light smoker, you have an increased risk for cancer, heart disease, and lung disease  If you are pregnant or have diabetes, smoking increases your risk for complications  What are the benefits to my health if I stop smoking? · You decrease respiratory symptoms such as coughing, wheezing, and shortness of breath  · You reduce your risk for cancers of the lung, mouth, throat, kidney, bladder, pancreas, stomach, and cervix  If you already have cancer, you increase the benefits of chemotherapy  You also reduce your risk for cancer returning or a second cancer from developing  · You reduce your risk for heart disease, blood clots, heart attack, and stroke  · You reduce your risk for lung infections, and diseases such as pneumonia, asthma, chronic bronchitis, and emphysema  · Your circulation improves  More oxygen can be delivered to your body  If you have diabetes, you lower your risk for complications, such as kidney, artery, and eye diseases  You also lower your risk for nerve damage  Nerve damage can lead to amputations, poor vision, and blindness  · You improve your body's ability to heal and to fight infections  What are the health benefits to others if I stop smoking? Tobacco is harmful to nonsmokers who breathe in your secondhand smoke  The following are ways the health of others around you may improve when you stop smoking:  · You lower the risks for lung cancer and heart disease in nonsmoking adults  · If you are pregnant, you lower the risk for miscarriage, early delivery, low birth weight, and stillbirth  You also lower your baby's risk for SIDS, obesity, developmental delay, and neurobehavioral problems, such as ADHD  · If you have children, you lower their risk for ear infections, colds, pneumonia, bronchitis, and asthma  Where can I find more information and support to stop smoking? · [place]  Phone: 5- 702 - 861-0032  Web Address: www Medium  CARE AGREEMENT:   You have the right to help plan your care  Learn about your health condition and how it may be treated  Discuss treatment options with your caregivers to decide what care you want to receive  You always have the right to refuse treatment  The above information is an  only  It is not intended as medical advice for individual conditions or treatments  Talk to your doctor, nurse or pharmacist before following any medical regimen to see if it is safe and effective for you  © 2017 [address] Information is for End User's use only and may not be sold, redistributed or otherwise used for commercial purposes  All illustrations and images included in CareNotes® are the copyrighted property of [employer] M , Inc  or Renaldo Charlene  How to Stop Smoking   WHAT YOU NEED TO KNOW:   You will improve your health and the health of others around you if you stop smoking  Your risk for heart and lung disease, cancer, stroke, heart attack, and vision problems will also decrease  You can benefit from quitting no matter how long you have smoked  DISCHARGE INSTRUCTIONS:   Prepare to stop smoking:  Nicotine is a highly addictive drug found in cigarettes  Withdrawal symptoms can happen when you stop smoking and make it hard to quit  These include anxiety, depression, irritability, trouble sleeping, and increased appetite  You increase your chances of success if you prepare to quit  · Set a quit date  [place] a date that is within the next 2 weeks  Do not pick a day that you think may be stressful or busy  Write down the day or [place] it on your calender  · Tell friends and family that you plan to quit  Explain that you may have withdrawal symptoms when you try to quit  Ask them to support you  They may be able to encourage you and help reduce your stress to make it easier for you to quit  · Make a list of your reasons for quitting    Put the list somewhere you will see it every day, such as your refrigerator  You can look at the list when you have a craving  · Remove all tobacco and nicotine products from your home, car, and workplace  Also, remove anything else that will tempt you to smoke, such as lighters, matches, or ashtrays  Clean your car, home, and places at work that smell like smoke  The smell of smoke can trigger a craving  · Identify triggers that make you want to smoke  This may include activities, feelings, or people  Also write down 1 way you can deal with each of your triggers  For example, if you want to smoke as soon as you wake up, plan another activity during this time, such as exercise  · Make a plan for how you will quit  Learn about the tools that can help you quit, such as medicine, counseling, or nicotine replacement therapy  Choose at least 2 options to help you quit  Tools to help you stop smoking:   · Counseling  from a trained healthcare provider can provide you with support and skills to quit smoking  The provider will also teach you to manage your withdrawal symptoms and cravings  You may receive counseling from one counselor, in group therapy, or through phone therapy called a quit line  · Nicotine replacement therapy (NRT)  such as nicotine patches, gum, or lozenges may help reduce your nicotine cravings  You may get these without a doctor's order  Do not use e-cigarettes or smokeless tobacco in place of cigarettes or to help you quit  They still contain nicotine  · Prescription medicines  such as nasal sprays or nicotine inhalers may help reduce your withdrawal symptoms  Other medicines may also be used to reduce your urge to smoke  Ask your healthcare provider about these medicines  You may need to start certain medicines 2 weeks before your quit date for them to work well  · Hypnosis  is a practice that helps guide you through thoughts and feelings  Hypnosis may help decrease your cravings and make you more willing to quit  · Acupuncture therapy  uses very thin needles to balance energy channels in the body  This is thought to help decrease cravings and symptoms of nicotine withdrawal      · Support groups  let you talk to others who are trying to quit or have already quit  It may be helpful to speak with others about how they quit  Manage your cravings:   · Avoid situations, people, and places that tempt you to smoke  Go to nonsmoking places, such as libraries or restaurants  Understand what tempts you and try to avoid these things  · Keep your hands busy  Hold things such as a stress ball or pen  · Put candy or toothpicks in your mouth  Keep lollipops, sugarless gum, or toothpicks with you at all times  · Do not have alcohol or caffeine  These drinks may tempt you to smoke  Drink healthy liquids such as water or juice instead  · Reward yourself when you resist your cravings  Rewards will motivate you and help you stay positive  · Do an activity that distracts you from your craving  Examples include going for a walk, exercising, or cleaning  Prevent weight gain after you quit:  You may gain a few pounds after you quit smoking  It is healthier for you to gain a few pounds than to continue to smoke  The following can help you prevent weight gain:  · Eat healthy foods  These include fruits, vegetables, whole-grain breads, low-fat dairy products, beans, lean meats, and fish  Eat healthy snacks, such as low-fat yogurt, if you get hungry between meals  · Drink water before, during, and between meals  This will make your stomach feel full and help prevent you from overeating  Ask your healthcare provider how much liquid to drink each day and which liquids are best for you  · Exercise  Take a walk or do some kind of exercise every day  Ask your healthcare provider what exercise is right for you  This may help reduce your cravings and reduce stress    For support and more information: · [employer]oke[employer]  gov  Phone: 5- 514 - 519-2278  Web Address: www smokefree  gov  © 2017 [address] Information is for End User's use only and may not be sold, redistributed or otherwise used for commercial purposes  All illustrations and images included in CareNotes® are the copyrighted property of [employer]  or Renaldo Chang  The above information is an  only  It is not intended as medical advice for individual conditions or treatments  Talk to your doctor, nurse or pharmacist before following any medical regimen to see if it is safe and effective for you

## 2018-09-14 NOTE — OP NOTE
OPERATIVE REPORT  PATIENT NAME: Rolo Salamanca    :  2000  MRN: [id number]  Pt Location: [facility] OR ROOM 01    SURGERY DATE: 2018    Surgeon(s) and Role:     * Trinity Jean MD - Primary     * Rosmery Hussein PA-C - Assisting  The PA was necessary to provide expert assistance and optimize patient safety in the abscence of a qualified surgical resident  Preop Diagnosis:  Calculus of gallbladder with acute and chronic cholecystitis without obstruction [K80 12]    Post-Op Diagnosis Codes:     * Calculus of gallbladder with acute and chronic cholecystitis without obstruction [K80 12]    Procedure(s) (LRB):  CHOLECYSTECTOMY LAPAROSCOPIC (N/A)  CHOLANGIOGRAM (N/A)    Specimen(s):  ID Type Source Tests Collected by Time Destination   1 : GALLBLADDER Tissue Gallbladder TISSUE EXAM Trinity Jean MD 2018 1311        Estimated Blood Loss:   Minimal    Drains:       Anesthesia Type:   General    Operative Indications:  Calculus of gallbladder with acute and chronic cholecystitis without obstruction [K80 12]  The patient is a 68-year-old female presenting with signs and symptoms of acute calculous cholecystitis for which definitive treatment is now indicated  Operative Findings:  The patient was found to have acute on chronic calculous cholecystitis  Laparoscopic cholecystectomy with intraoperative cholangiogram was performed in the routine fashion  During the course the dissection the critical structures defined  The cystic duct dissected out circumferentially  The cystic artery dissected out circumferentially  The fluoroscopic cholangiogram confirmed the proper anatomy  There is free flow of contrast antegrade and retrograde the common bile duct, no evidence for choledocholithiasis and free flow of contrast into the duodenal sweep  The cholecystectomy completed laparoscopically  The patient tolerated the procedure well  Complications:   None    Procedure and Technique:   The patient was taken to the operating room where they were properly identified, monitored and anesthetized  They received antibiotics perioperatively  Venodyne's were placed prior to the induction of anesthesia for DVT prophylaxis  The abdomen prepped and draped under sterile conditions using aseptic technique  Timeout performed  Skin incised at the umbilicus  Peritoneal cavity entered bluntly with a Sophia clamp  11 mm trocar inserted and pneumoperitoneum established to 15 mmHg  4 quadrants of the abdomen and inspected laparoscopically and no additional pathology was identified  3 additional working ports placed  These were 5 mm ports in the epigastrium and right upper quadrant  The patient placed in reverse Trendelenburg, left side down  Gallbladder grasped at its dome retracted cephalad and to the right  Infundibulum grasped and retracted laterally  [place] defined and skeletonized  Cystic duct dissected out circumferentially  Cystic artery dissected out circumferentially  Cystic artery clipped below and divided above with harmonic emily  A clip placed on the up side of the cystic duct  An nick made in the cystic duct and a cholangiocatheter advanced and secured  Fluoroscopic cholangiogram was performed with the above findings noted  Cholangiocatheter removed  Cystic duct clipped twice on the down side and divided between clips  Gallbladder dissected off the liver with harmonic emily  The gallbladder was placed in an Endobag and delivered through the umbilical trocar site  Pneumoperitoneum reestablished to 15 mmHg  Scope advanced and the right upper quadrant inspected  Good hemostasis found  The procedure completed with removal of all ports  The fascial defect at the umbilicus closed with a figure-of-eight suture of 0 Vicryl  Skin closed with subcuticular 4-0 Monocryl suture  Wounds infiltrated with half percent Marcaine  The wounds dressed   The patient extubated and taken to recovery in stable condition         I was present for the entire procedure    Patient Disposition:  PACU     SIGNATURE: Aries Mckeon MD  DATE: September 14, 2018  TIME: 1:26 PM

## 2018-09-14 NOTE — H&P
H&P Exam - General Surgery   Suellen Newman 19 y o  female MRN: [id number]  Unit/Bed#: ED 09 Encounter: [id number]    Assessment/Plan     Assessment:  The patient is a pleasant 70-year-old female with no significant past medical history presenting to the [facility] with signs and symptoms of acute calculous cholecystitis for which definitive treatment by laparoscopic cholecystectomy is now indicated  Plan:  The technical details of laparoscopic cholecystectomy with intraoperative cholangiogram were explained to the patient as were the options which include no surgery and supportive care  Specific risks related to anesthesia, bleeding, infection, postoperative bile leak and common bile duct injury necessitating additional surgical interventions were explained in clear simple terms  All questions answered to the satisfaction of the patient and informed consent obtained  History of Present Illness     HPI:  Suellen Newman is a 19 y o  female who presents with signs and symptoms of acute calculous cholecystitis  Review of Systems   Constitutional: Positive for activity change and appetite change  HENT: Negative  Eyes: Negative  Respiratory: Negative  Cardiovascular: Negative  Gastrointestinal: Positive for abdominal pain and nausea  Negative for vomiting  Endocrine: Negative  Genitourinary: Negative  Musculoskeletal: Negative  Skin: Negative  Allergic/Immunologic: Negative  Neurological: Negative  Hematological: Negative  Psychiatric/Behavioral: Negative          Historical Information   Past Medical History:   Diagnosis Date    History of strep sore throat      Past Surgical History:   Procedure Laterality Date    NO PAST SURGERIES       Social History   History   Alcohol Use    Yes     Comment: occasional      History   Drug Use No     Comment: hx     History   Smoking Status    Current Every Day Smoker    Packs/day: 0 25    Types: Cigarettes   Smokeless Tobacco    Never Used     Family History: non-contributory    Meds/Allergies   all medications and allergies reviewed  No Known Allergies    Objective   First Vitals:   Blood Pressure: 128/65 (09/14/18 0700)  Pulse: 80 (09/14/18 0700)  Temperature: 98 °F (36 7 °C) (09/14/18 0700)  Temp Source: Oral (09/14/18 0700)  Respirations: 14 (09/14/18 0700)  Height: 5' 7" (170 2 cm) (09/14/18 0700)  Weight - Scale: 84 kg (185 lb 3 oz) (09/14/18 0700)  SpO2: 98 % (09/14/18 0700)    Current Vitals:   Blood Pressure: 105/55 (09/14/18 1045)  Pulse: 72 (09/14/18 1045)  Temperature: 98 1 °F (36 7 °C) (09/14/18 1045)  Temp Source: Oral (09/14/18 1045)  Respirations: 16 (09/14/18 1045)  Height: 5' 7" (170 2 cm) (09/14/18 0700)  Weight - Scale: 84 kg (185 lb 3 oz) (09/14/18 0700)  SpO2: 100 % (09/14/18 1045)      Intake/Output Summary (Last 24 hours) at 09/14/18 1148  Last data filed at 09/14/18 0916   Gross per 24 hour   Intake             1000 ml   Output                0 ml   Net             1000 ml       Invasive Devices     Peripheral Intravenous Line            Peripheral IV 09/14/18 Right Antecubital less than 1 day                Physical Exam   Constitutional: She is oriented to person, place, and time  The patient is a well-nourished well-developed female  She is in no acute distress  She is awake alert and oriented  She is a reliable historian and competent to make decisions regarding her own health care  HENT:   Head: Normocephalic and atraumatic  Eyes: Conjunctivae are normal  Pupils are equal, round, and reactive to light  Neck: Normal range of motion  Neck supple  Cardiovascular: Normal rate and regular rhythm  Pulmonary/Chest: Effort normal and breath sounds normal    Abdominal:   Her abdomen is soft  Rotund  Focally tender to percussion palpation limited to the right upper quadrant with a positive Esquivel sign  Musculoskeletal: Normal range of motion     Neurological: She is alert and oriented to person, place, and time  Skin: Skin is warm and dry  Psychiatric: Her behavior is normal  Judgment and thought content normal        Lab Results: I have personally reviewed pertinent lab results  Imaging: I have personally reviewed pertinent reports  EKG, Pathology, and Other Studies: I have personally reviewed pertinent reports  Code Status: No Order  Advance Directive and Living Will:      Power of :    POLST:      Counseling / Coordination of Care  Total floor / unit time spent today 30 minutes  Greater than 50% of total time was spent with the patient and / or family counseling and / or coordination of care  A description of the counseling / coordination of care: 15

## 2018-09-14 NOTE — ANESTHESIA PREPROCEDURE EVALUATION
Review of Systems/Medical History  Patient summary reviewed  Chart reviewed      Cardiovascular  Negative cardio ROS    Pulmonary  Smoker cigar smoker  , Tobacco cessation counseling given ,        GI/Hepatic      Comment: Cholelithiasis           Endo/Other  Negative endo/other ROS      GYN  Negative gynecology ROS          Hematology  Negative hematology ROS      Musculoskeletal  Negative musculoskeletal ROS        Neurology  Negative neurology ROS      Psychology   Negative psychology ROS              Physical Exam    Airway    Mallampati score: II  TM Distance: >3 FB  Neck ROM: full     Dental   No notable dental hx     Cardiovascular  Comment: Negative ROS, Rhythm: regular, Rate: normal, Cardiovascular exam normal    Pulmonary  Pulmonary exam normal Breath sounds clear to auscultation,     Other Findings        Anesthesia Plan  ASA Score- 2     Anesthesia Type- general with ASA Monitors  Additional Monitors:   Airway Plan: ETT  Plan Factors-    Induction- intravenous  Postoperative Plan- Plan for postoperative opioid use  Planned trial extubation    Informed Consent- Anesthetic plan and risks discussed with patient

## 2018-09-14 NOTE — ED PROVIDER NOTES
History  Chief Complaint   Patient presents with    Abdominal Pain     upper abdominal pain since 0500, "feels like someone is turning my insides", 1 episode of vomiting   Sore Throat     x 2 days     18y  o female with no significant PMH presents to the ER for upper abdominal pain since 05:00 today  Patient denies taking any medication for pain  She describes her pain as twisting and radiating all over her abdomen  She rates her pain 8/10 and states it is constant  Associated symptoms: sore throat, vomiting, frequency and urgency  She denies fever, chills, rhinorrhea, congestion, chest pain, dyspnea, nausea, diarrhea, hematuria, dysuria, weakness or paresthesias  Patient states she has had symptoms like this in the past but they always resolved and were not this bad  History provided by:  Patient   used: No        None       Past Medical History:   Diagnosis Date    History of strep sore throat        Past Surgical History:   Procedure Laterality Date    NO PAST SURGERIES         History reviewed  No pertinent family history  I have reviewed and agree with the history as documented  Social History   Substance Use Topics    Smoking status: Current Every Day Smoker     Packs/day: 0 25     Types: Cigarettes    Smokeless tobacco: Never Used    Alcohol use Yes      Comment: occasional         Review of Systems   Constitutional: Negative for chills and fever  HENT: Positive for sore throat  Negative for congestion, drooling, ear discharge, ear pain, facial swelling and rhinorrhea  Eyes: Negative for redness  Respiratory: Negative for shortness of breath  Cardiovascular: Negative for chest pain  Gastrointestinal: Positive for abdominal pain and vomiting  Negative for diarrhea and nausea  Genitourinary: Positive for frequency and urgency  Negative for dysuria and hematuria  Musculoskeletal: Negative for neck stiffness  Skin: Negative for rash  Allergic/Immunologic: Negative for food allergies  Neurological: Negative for weakness and numbness  Physical Exam  Physical Exam   Constitutional:  Non-toxic appearance  No distress  HENT:   Head: Normocephalic and atraumatic  Right Ear: Tympanic membrane, external ear and ear canal normal  No drainage, swelling or tenderness  No foreign bodies  Tympanic membrane is not erythematous  No hemotympanum  Left Ear: Tympanic membrane, external ear and ear canal normal  No drainage, swelling or tenderness  No foreign bodies  Tympanic membrane is not erythematous  No hemotympanum  Nose: Nose normal    Mouth/Throat: Uvula is midline and mucous membranes are normal  No uvula swelling  Posterior oropharyngeal edema and posterior oropharyngeal erythema present  No tonsillar abscesses  Tonsillar exudate  Neck: Normal range of motion and phonation normal  Neck supple  No tracheal deviation present  Cardiovascular: Normal rate, regular rhythm, S1 normal, S2 normal and normal heart sounds  Exam reveals no gallop and no friction rub  No murmur heard  Pulmonary/Chest: Effort normal and breath sounds normal  No respiratory distress  She has no decreased breath sounds  She has no wheezes  She has no rhonchi  She has no rales  She exhibits no tenderness  Abdominal: Soft  Bowel sounds are normal  She exhibits no distension  There is tenderness in the right upper quadrant and epigastric area  There is no rebound, no guarding and no CVA tenderness  Neurological: She is alert  GCS eye subscore is 4  GCS verbal subscore is 5  GCS motor subscore is 6  Skin: Skin is warm and dry  No rash noted  Psychiatric: She has a normal mood and affect  Nursing note and vitals reviewed        Vital Signs  ED Triage Vitals   Temperature Pulse Respirations Blood Pressure SpO2   09/14/18 0700 09/14/18 0700 09/14/18 0700 09/14/18 0700 09/14/18 0700   98 °F (36 7 °C) 80 14 128/65 98 %      Temp Source Heart Rate Source Patient Position - Orthostatic VS BP Location FiO2 (%)   09/14/18 0700 09/14/18 0700 09/14/18 0900 09/14/18 0700 --   Oral Monitor Lying Right arm       Pain Score       09/14/18 0700       8           Vitals:    09/14/18 0700 09/14/18 0900 09/14/18 1045 09/14/18 1203   BP: 128/65 108/56 105/55 104/59   Pulse: 80 60 72 55   Patient Position - Orthostatic VS:  Lying Lying        Visual Acuity      ED Medications  Medications   sodium chloride 0 9 % infusion ( Intravenous New Bag 9/14/18 1227)   sodium chloride 0 9 % bolus 1,000 mL (0 mL Intravenous Stopped 9/14/18 0916)   aluminum-magnesium hydroxide-simethicone (MYLANTA) 200-200-20 mg/5 mL oral suspension 15 mL (15 mL Oral Given 9/14/18 0803)   lidocaine viscous (XYLOCAINE) 2 % mucosal solution 15 mL (15 mL Swish & Swallow Given 9/14/18 0803)   iohexol (OMNIPAQUE) 350 MG/ML injection (MULTI-DOSE) 100 mL (100 mL Intravenous Given 9/14/18 0846)   cephalexin (KEFLEX) capsule 500 mg (500 mg Oral Given 9/14/18 1044)       Diagnostic Studies  Results Reviewed     Procedure Component Value Units Date/Time    Comprehensive metabolic panel [[id number]] Collected:  09/14/18 0750    Lab Status:  Final result Specimen:  Blood from Arm, Right Updated:  09/14/18 0820     Sodium 138 mmol/L      Potassium 3 7 mmol/L      Chloride 104 mmol/L      CO2 25 mmol/L      ANION GAP 9 mmol/L      BUN 13 mg/dL      Creatinine 0 77 mg/dL      Glucose 92 mg/dL      Calcium 8 7 mg/dL      AST 13 U/L      ALT 19 U/L      Alkaline Phosphatase 55 U/L      Total Protein 7 4 g/dL      Albumin 3 6 g/dL      Total Bilirubin 0 33 mg/dL      eGFR 112 ml/min/1 73sq m     Narrative:         National Kidney Disease Education Program recommendations are as follows:  GFR calculation is accurate only with a steady state creatinine  Chronic Kidney disease less than 60 ml/min/1 73 sq  meters  Kidney failure less than 15 ml/min/1 73 sq  meters      Lipase [[id number]]  (Normal) Collected:  09/14/18 0750    Lab Status:  Final result Specimen:  Blood from Arm, Right Updated:  09/14/18 0820     Lipase 163 u/L     CBC and differential [[id number]] Collected:  09/14/18 0750    Lab Status:  Final result Specimen:  Blood from Arm, Right Updated:  09/14/18 0804     WBC 8 24 Thousand/uL      RBC 4 53 Million/uL      Hemoglobin 13 8 g/dL      Hematocrit 40 1 %      MCV 89 fL      MCH 30 5 pg      MCHC 34 4 g/dL      RDW 12 0 %      MPV 10 3 fL      Platelets 361 Thousands/uL      nRBC 0 /100 WBCs      Neutrophils Relative 74 %      Immat GRANS % 0 %      Lymphocytes Relative 17 %      Monocytes Relative 7 %      Eosinophils Relative 1 %      Basophils Relative 1 %      Neutrophils Absolute 6 13 Thousands/µL      Immature Grans Absolute 0 03 Thousand/uL      Lymphocytes Absolute 1 38 Thousands/µL      Monocytes Absolute 0 55 Thousand/µL      Eosinophils Absolute 0 10 Thousand/µL      Basophils Absolute 0 05 Thousands/µL     Urine Microscopic [[id number]]  (Abnormal) Collected:  09/14/18 0719    Lab Status:  Final result Specimen:  Urine from Urine, Clean Catch Updated:  09/14/18 0729     RBC, UA 0-1 (A) /hpf      WBC, UA 2-4 (A) /hpf      Epithelial Cells Occasional /hpf      Bacteria, UA Occasional /hpf     POCT urinalysis dipstick [[id number]]  (Abnormal) Resulted:  09/14/18 0712    Lab Status:  Final result Updated:  09/14/18 0712    POCT pregnancy, urine [[id number]]  (Normal) Resulted:  09/14/18 0711    Lab Status:  Final result Updated:  09/14/18 0712     EXT PREG TEST UR (Ref: Negative) NEG ( - )    ED Urine Macroscopic [[id number]]  (Abnormal) Collected:  09/14/18 0719    Lab Status:  Final result Specimen:  Urine Updated:  09/14/18 0710     Color, UA Yellow     Clarity, UA Clear     pH, UA 6 5     Leukocytes, UA Small (A)     Nitrite, UA Negative     Protein, UA Negative mg/dl      Glucose, UA Negative mg/dl      Ketones, UA Negative mg/dl      Urobilinogen, UA 1 0 E U /dl      Bilirubin, UA Negative     Blood, UA Trace (A) Specific [place], UA 1 025    Narrative:       CLINITEK RESULT                 US gallbladder   Final Result by Wilfrido Pepper DO (09/14 2852)   1  Cholelithiasis without sonographic evidence of acute cholecystitis  If there is continued concern for acute cholecystitis, nuclear medicine HIDA scan should be obtained to evaluate for cystic duct patency  2   Hepatomegaly  Workstation performed: [id number]         CT abdomen pelvis with contrast   Final Result by Wendy Gutiérrez MD (09/14 1367)      No evidence of acute appendicitis      Cholelithiasis with mildly distended gallbladder with no biliary dilation, if concern for acute cholecystitis  Correlate clinically      Small amount of free fluid in the pelvis with a small involuting right ovarian follicle  No bowel obstruction      The study was marked in EPIC for significant notification  Workstation performed: [id number]         XR cholangiogram intraoperative    (Results Pending)              Procedures  Procedures       Phone Contacts  ED Phone Contact    ED Course  ED Course as of Sep 14 1245   Fri Sep 14, 2018   [address] general surgery  [address] with [address] Arleen Tiwari  Will admit for cholecystectomy  1026 Patient going to decide whether or not she wants to stay  [phone] Patient decided to get surgery  Will admit  MDM  Number of Diagnoses or Management Options  Cholelithiasis: new and requires workup  Pharyngitis: new and requires workup  RUQ pain: new and requires workup  UTI (urinary tract infection): new and requires workup  Diagnosis management comments: DDX consists of but not limited to: viral syndrome, strep, abscess, mono, cholecystitis, pancreatitis, obstruction    Will check CBC, CMP, lipase, urine, pregnancy and CT abdomen  Will give fluids, mylanta and viscous lidocaine  Cholelithiasis seen on CT with distended gallbladder  Will check ultrasound   Patient reports some improvement in symptoms but continues to have discomfort  She does not want any pain medication  1010 Will page general surgery to talk about CT and ultrasound findings  [address] with Dr Cecilia Jaime  Will admit for cholecystectomy  1026 Patient going to decide whether or not she wants to stay for surgery  [phone] Patient decided to get surgery  Will admit to outpatient bed to go to the OR  Patient stable at time of transfer to OR  Amount and/or Complexity of Data Reviewed  Clinical lab tests: ordered and reviewed  Tests in the radiology section of CPT®: ordered and reviewed  Discuss the patient with other providers: yes (Spoke with general surgery )    Patient Progress  Patient progress: stable    CritCare Time    Disposition  Final diagnoses:   Pharyngitis   UTI (urinary tract infection)   Cholelithiasis   RUQ pain     Time reflects when diagnosis was documented in both MDM as applicable and the Disposition within this note     Time User Action Codes Description Comment    9/14/2018 10:25 AM Meghana Leone Add [K80 12] Calculus of gallbladder with acute and chronic cholecystitis without obstruction     9/14/2018 10:31 AM Ellen Banister A Add [J02 9] Pharyngitis     9/14/2018 10:31 AM Ellen Banister A Add [N39 0] UTI (urinary tract infection)     9/14/2018 10:31 AM Ellen Banister A Add [K80 20] Cholelithiasis     9/14/2018 10:31 AM Ellen Banister A Add [R10 11] RUQ pain       ED Disposition     ED Disposition Condition Comment    Send to OR        Follow-up Information    None         There are no discharge medications for this patient  No discharge procedures on file      ED Provider  Electronically Signed by           Myla Catalan PA-C  09/14/18 9081

## 2019-01-14 ENCOUNTER — APPOINTMENT (EMERGENCY)
Dept: RADIOLOGY | Facility: HOSPITAL | Age: 19
End: 2019-01-14
Payer: COMMERCIAL

## 2019-01-14 ENCOUNTER — HOSPITAL ENCOUNTER (EMERGENCY)
Facility: HOSPITAL | Age: 19
Discharge: HOME | End: 2019-01-14
Attending: EMERGENCY MEDICINE | Admitting: EMERGENCY MEDICINE
Payer: COMMERCIAL

## 2019-01-14 VITALS
SYSTOLIC BLOOD PRESSURE: 118 MMHG | RESPIRATION RATE: 18 BRPM | HEART RATE: 84 BPM | TEMPERATURE: 99.5 F | DIASTOLIC BLOOD PRESSURE: 59 MMHG | OXYGEN SATURATION: 100 %

## 2019-01-14 DIAGNOSIS — M54.50 LOW BACK PAIN: Primary | ICD-10-CM

## 2019-01-14 PROCEDURE — 99283 EMERGENCY DEPT VISIT LOW MDM: CPT

## 2019-01-14 PROCEDURE — 72100 X-RAY EXAM L-S SPINE 2/3 VWS: CPT

## 2019-01-14 PROCEDURE — 72220 X-RAY EXAM SACRUM TAILBONE: CPT

## 2019-01-14 PROCEDURE — 96372 THER/PROPH/DIAG INJ SC/IM: CPT

## 2019-01-14 RX ORDER — KETOROLAC TROMETHAMINE 30 MG/ML
30 INJECTION, SOLUTION INTRAMUSCULAR; INTRAVENOUS ONCE
Status: COMPLETED | OUTPATIENT
Start: 2019-01-14 | End: 2019-01-14

## 2019-01-14 RX ORDER — NAPROXEN 500 MG/1
500 TABLET ORAL 2 TIMES DAILY WITH MEALS
Qty: 15 TABLET | Refills: 0 | Status: SHIPPED | OUTPATIENT
Start: 2019-01-14

## 2019-01-14 RX ADMIN — KETOROLAC TROMETHAMINE 30 MG: 30 INJECTION, SOLUTION INTRAMUSCULAR at 22:10

## 2019-01-15 NOTE — ED PROVIDER NOTES
History  Chief Complaint   Patient presents with    Back Pain     slipped on stairs slid down on back 5-6 steps, reports lower back pain and right arm pain, denies radiation of pain  A 25-year-old female with no significant past medical history; presents with low back pain after a fall 2 days ago  Patient states she slipped on steps covered in snow, causing her to fall down 5-6 steps landing on her low back  Patient states she initially had some pain, however today at work it significantly worsen  Pain is located in the low back and radiates across the low back, however not down the lower extremities  Patient denies associated paresthesias, weakness, saddle anesthesias, bowel and bladder incontinence  Patient did initially also complain of right arm pain, however on further questioning reports that is more sore at this time then painful  Patient has been able to have full range of motion to the right arm without difficulty  Patient is otherwise healthy  A/P:  Low back pain, midline lumbar spinal tenderness appreciated  Neurologically intact  Normal gait  Will obtain x-ray to rule out fracture/dislocation  Will give dose of IM Toradol  History provided by:  Patient      None       Past Medical History:   Diagnosis Date    History of strep sore throat        Past Surgical History:   Procedure Laterality Date    CHOLANGIOGRAM N/A 9/14/2018    Procedure: CHOLANGIOGRAM;  Surgeon: Troy Vitale MD;  Location: [facility];  Service: General    CHOLECYSTECTOMY LAPAROSCOPIC N/A 9/14/2018    Procedure: CHOLECYSTECTOMY LAPAROSCOPIC;  Surgeon: Troy Vitale MD;  Location: [facility];  Service: General    NO PAST SURGERIES         History reviewed  No pertinent family history  I have reviewed and agree with the history as documented      Social History   Substance Use Topics    Smoking status: Current Every Day Smoker     Packs/day: 0 25     Types: Cigarettes    Smokeless tobacco: Never Used    Alcohol use Yes      Comment: occasional         Review of Systems   Musculoskeletal: Positive for arthralgias ( right shoulder) and back pain ( low back)  All other systems reviewed and are negative  Physical Exam  Physical Exam  General Appearance: alert and oriented, nad, non toxic appearing  Skin:  Warm, dry, intact  HEENT: atraumatic, normocephalic  Neck: Supple, trachea midline  Cardiac: RRR; no murmurs, rub, gallops  Pulmonary: lungs CTAB; no wheezes, rales, rhonchi  Gastrointestinal: abdomen soft, nontender, nondistended; no guarding or rebound tenderness; good bowel sounds, no mass or bruits  Extremities:  Midline lumbar spinal tenderness, also with tenderness over the bilateral sacroiliac joints  Full range of motion to the bilateral lower extremities without difficulty  Bilateral upper and lower extremities nontender with full range of motion  No pedal edema, 2+ pulses; no calf tenderness, no clubbing, no cyanosis    Normal gait  Neuro:  no focal motor or sensory deficits, CN 2-12 grossly intact  Psych:  Normal mood and affect, normal judgement and insight      Vital Signs  ED Triage Vitals [01/14/19 2125]   Temperature Pulse Respirations Blood Pressure SpO2   99 5 °F (37 5 °C) 84 18 118/59 100 %      Temp Source Heart Rate Source Patient Position - Orthostatic VS BP Location FiO2 (%)   Temporal -- Sitting Right arm --      Pain Score       7           Vitals:    01/14/19 2125   BP: 118/59   Pulse: 84   Patient Position - Orthostatic VS: Sitting       Visual Acuity      ED Medications  Medications   ketorolac (TORADOL) injection 30 mg (30 mg Intramuscular Given 1/14/19 2210)       Diagnostic Studies  Results Reviewed     None                 XR sacrum and coccyx   ED Interpretation by April Edouard DO (01/14 2234)   No acute fracture or dislocation      XR lumbar spine 2 or 3 views   ED Interpretation by April Edouard DO (01/14 2233)   No acute fracture or dislocation Procedures  Procedures       Phone Contacts  ED Phone Contact    ED Course                               MDM  CritCare Time    Disposition  Final diagnoses:   Low back pain     Time reflects when diagnosis was documented in both MDM as applicable and the Disposition within this note     Time User Action Codes Description Comment    1/14/2019 10:34 PM Shad [address] [M54 5] Low back pain       ED Disposition     ED Disposition Condition Comment    Discharge  Benjamin Echeverria discharge to home/self care  Condition at discharge: Good        Follow-up Information     Follow up With Specialties Details Why [facility] Schedule an appointment as soon as possible for a visit in 2 days For re-evaluation [facility] [place] [id number]  [phone]            Discharge Medication List as of 1/14/2019 10:35 PM      START taking these medications    Details   naproxen (NAPROSYN) 500 mg tablet Take 1 tablet (500 mg total) by mouth 2 (two) times a day with meals, Starting Mon 1/14/2019, Print           No discharge procedures on file      ED Provider  Electronically Signed by           Godwin Morris DO  01/14/19 2509

## 2020-06-21 ENCOUNTER — HOSPITAL ENCOUNTER (EMERGENCY)
Facility: HOSPITAL | Age: 20
Discharge: HOME | End: 2020-06-21
Attending: EMERGENCY MEDICINE

## 2020-06-21 VITALS
HEART RATE: 96 BPM | DIASTOLIC BLOOD PRESSURE: 72 MMHG | OXYGEN SATURATION: 100 % | TEMPERATURE: 99 F | WEIGHT: 188.49 LBS | SYSTOLIC BLOOD PRESSURE: 119 MMHG | RESPIRATION RATE: 14 BRPM | BODY MASS INDEX: 29.52 KG/M2

## 2020-06-21 DIAGNOSIS — R07.89 ANTERIOR CHEST WALL PAIN: Primary | ICD-10-CM

## 2020-06-21 PROCEDURE — 99284 EMERGENCY DEPT VISIT MOD MDM: CPT | Performed by: EMERGENCY MEDICINE

## 2020-06-21 PROCEDURE — 99284 EMERGENCY DEPT VISIT MOD MDM: CPT

## 2020-06-21 RX ORDER — NAPROXEN 250 MG/1
500 TABLET ORAL ONCE
Status: COMPLETED | OUTPATIENT
Start: 2020-06-21 | End: 2020-06-21

## 2020-06-21 RX ORDER — NAPROXEN 500 MG/1
500 TABLET ORAL 2 TIMES DAILY PRN
Qty: 14 TABLET | Refills: 0 | Status: SHIPPED | OUTPATIENT
Start: 2020-06-21

## 2020-06-21 RX ORDER — LIDOCAINE 50 MG/G
1 PATCH TOPICAL ONCE
Status: DISCONTINUED | OUTPATIENT
Start: 2020-06-21 | End: 2020-06-21 | Stop reason: HOSPADM

## 2020-06-21 RX ORDER — LIDOCAINE 40 MG/G
CREAM TOPICAL 3 TIMES DAILY
Qty: 30 G | Refills: 0 | Status: SHIPPED | OUTPATIENT
Start: 2020-06-21

## 2020-06-21 RX ADMIN — LIDOCAINE 1 PATCH: 50 PATCH TOPICAL at 12:19

## 2020-06-21 RX ADMIN — NAPROXEN 500 MG: 250 TABLET ORAL at 12:18

## 2022-07-27 ENCOUNTER — HOSPITAL ENCOUNTER (EMERGENCY)
Facility: HOSPITAL | Age: 22
Discharge: HOME | End: 2022-07-27
Attending: EMERGENCY MEDICINE | Admitting: EMERGENCY MEDICINE

## 2022-07-27 VITALS
HEART RATE: 84 BPM | RESPIRATION RATE: 16 BRPM | DIASTOLIC BLOOD PRESSURE: 75 MMHG | OXYGEN SATURATION: 99 % | WEIGHT: 185.19 LBS | BODY MASS INDEX: 29 KG/M2 | TEMPERATURE: 98.7 F | SYSTOLIC BLOOD PRESSURE: 136 MMHG

## 2022-07-27 DIAGNOSIS — J02.9 SORE THROAT: Primary | ICD-10-CM

## 2022-07-27 PROCEDURE — 99282 EMERGENCY DEPT VISIT SF MDM: CPT | Performed by: PHYSICIAN ASSISTANT

## 2022-07-27 PROCEDURE — 99282 EMERGENCY DEPT VISIT SF MDM: CPT

## 2022-07-27 NOTE — ED PROVIDER NOTES
History  Chief Complaint   Patient presents with    Sore Throat     Pt reports sore throat for 2 days  Reports pus from tonsils     Patient is a 19year old female with no significant PMH who presents to the ED with complaints of a sore throat since yesterday  She reports a fullness sensation in the right tonsil with mild discomfort to the area, 2/10 in severity, with some drainage  She pressed on her tonsil yesterday with a Q-tip and her finger and reports a small amount of mucous-like discharge, it may have been white, she was unsure, with no change in her discomfort  She did not try any medications prior to arrival  She denies pain, difficulty swallowing, stridor, drooling, voice change, throat swelling, SOB  The pain does not radiate into the jaw, neck or ear and she has no facial swelling, intraoral swelling  She has been otherwise healthy with no fevers, chills, diaphoresis, headache, congestion, rhinorrhea, cough, chest pain, nausea, vomiting, abdominal pain, known sick contacts, recent travel  Prior to Admission Medications   Prescriptions Last Dose Informant Patient Reported?  Taking?   lidocaine (LMX) 4 % cream   No No   Sig: Apply topically 3 (three) times a day   naproxen (NAPROSYN) 500 mg tablet   No No   Sig: Take 1 tablet (500 mg total) by mouth 2 (two) times a day with meals   naproxen (NAPROSYN) 500 mg tablet   No No   Sig: Take 1 tablet (500 mg total) by mouth 2 (two) times a day as needed for mild pain or moderate pain for up to 14 doses      Facility-Administered Medications: None       Past Medical History:   Diagnosis Date    History of strep sore throat        Past Surgical History:   Procedure Laterality Date    CHOLANGIOGRAM N/A 9/14/2018    Procedure: CHOLANGIOGRAM;  Surgeon: Qiana Rolle MD;  Location: [facility] OR;  Service: General    CHOLECYSTECTOMY LAPAROSCOPIC N/A 9/14/2018    Procedure: CHOLECYSTECTOMY LAPAROSCOPIC;  Surgeon: Qiana Rolle MD;  Location: [facility] OR;  Service: General    NO PAST SURGERIES         History reviewed  No pertinent family history  I have reviewed and agree with the history as documented  E-Cigarette/Vaping     E-Cigarette/Vaping Substances     Social History     Tobacco Use    Smoking status: Current Every Day Smoker     Packs/day: 0 25     Types: Cigarettes    Smokeless tobacco: Never Used   Substance Use Topics    Alcohol use: Yes     Comment: occasional     Drug use: No     Types: Marijuana     Comment: hx       Review of Systems   Constitutional: Negative for chills, diaphoresis and fever  HENT: Positive for sore throat  Negative for congestion, drooling, ear pain, facial swelling, rhinorrhea, trouble swallowing and voice change  Respiratory: Negative for cough, shortness of breath and stridor  Cardiovascular: Negative for chest pain  Gastrointestinal: Negative for abdominal pain, diarrhea, nausea and vomiting  Genitourinary: Negative for difficulty urinating  Musculoskeletal: Negative for neck pain  Skin: Negative for color change, pallor and rash  Neurological: Negative for light-headedness and headaches  All other systems reviewed and are negative  Physical Exam  Physical Exam  Vitals and nursing note reviewed  Constitutional:       General: She is awake  She is not in acute distress  Appearance: She is well-developed  She is not ill-appearing, toxic-appearing or diaphoretic  HENT:      Head: Normocephalic and atraumatic  Right Ear: Hearing, ear canal and external ear normal       Left Ear: Hearing, ear canal and external ear normal       Nose: Nose normal  No congestion  Mouth/Throat:      Mouth: Mucous membranes are moist  No oral lesions  Dentition: No dental tenderness or gingival swelling  Pharynx: Oropharynx is clear  Uvula midline  No pharyngeal swelling, oropharyngeal exudate, posterior oropharyngeal erythema or uvula swelling        Tonsils: No tonsillar exudate or tonsillar abscesses  2+ on the right  2+ on the left  Comments: No swelling, erythema, exudates, uvula deviation or swelling noted  No intraoral lesions noted  Eyes:      Conjunctiva/sclera: Conjunctivae normal       Pupils: Pupils are equal, round, and reactive to light  Cardiovascular:      Rate and Rhythm: Normal rate and regular rhythm  Pulses: Normal pulses  Heart sounds: Normal heart sounds  Pulmonary:      Effort: Pulmonary effort is normal  No respiratory distress  Breath sounds: Normal breath sounds  No stridor  No decreased breath sounds or wheezing  Abdominal:      General: There is no distension  Musculoskeletal:         General: Normal range of motion  Cervical back: Normal range of motion and neck supple  Lymphadenopathy:      Cervical: No cervical adenopathy  Skin:     General: Skin is warm and dry  Capillary Refill: Capillary refill takes less than 2 seconds  Neurological:      Mental Status: She is alert and oriented to person, place, and time  Psychiatric:         Behavior: Behavior is cooperative  Vital Signs  ED Triage Vitals [07/27/22 1235]   Temperature Pulse Respirations Blood Pressure SpO2   98 7 °F (37 1 °C) 84 16 136/75 99 %      Temp Source Heart Rate Source Patient Position - Orthostatic VS BP Location FiO2 (%)   Oral -- -- -- --      Pain Score       2           Vitals:    07/27/22 1235   BP: 136/75   Pulse: 84         Visual Acuity      ED Medications  Medications - No data to display    Diagnostic Studies  Results Reviewed     None                 No orders to display              Procedures  Procedures         ED Course                                             MDM  Number of Diagnoses or Management Options  Sore throat  Diagnosis management comments: Patient was offered strep test as she notes a history of strep throat  Patient declined  Reviewed treatment at home    Recommended follow-up with ENT for further evaluation of symptoms  Recommended follow-up with PCP as needed for monitoring of symptoms  The management plan was discussed in detail with the patient at bedside and all questions were answered  Provided both verbal and written instructions  Reviewed red flag symptoms and strict return to ED instructions  Patient notes understanding and agrees to plan  Disposition  Final diagnoses:   Sore throat     Time reflects when diagnosis was documented in both MDM as applicable and the Disposition within this note     Time User Action Codes Description Comment    7/27/2022  1:41 PM Kayli Man Add [J02 9] Sore throat       ED Disposition     ED Disposition   Discharge    Condition   Stable    Date/Time   Wed Jul 27, 2022  1:41 PM    Comment   Lupe Javier discharge to home/self care  Follow-up Information     Follow up With Specialties Details Why Contact Info Additional Information    [address] [facility] Emergency Medicine  If symptoms worsen [facility] [id number]  [address] [place] [address] Emergency Department, [address] [place] [address] ENT Otolaryngology  As needed [address] [address]  [address] E[address], [address] [place] [address], [id number] [phone]          Patient's Medications   Discharge Prescriptions    No medications on file       No discharge procedures on file      PDMP Review     None          ED Provider  Electronically Signed by           Chandan Galvan PA-C  07/27/22 8158

## 2022-07-27 NOTE — DISCHARGE INSTRUCTIONS
Saltwater gargles, hot tea, cold drinks, throat lozenges or sprays may help alleviate symptoms  Continue Tylenol, Motrin home as needed for pain  Follow-up with ENT for further evaluation of symptoms  Return to ED if symptoms worsen including increasing pain, stridor, drooling, difficulty swallowing, difficulty breathing, fevers, voice change

## 2022-07-27 NOTE — Clinical Note
Hazel Coad was seen and treated in our emergency department on 7/27/2022  Diagnosis:     Anne Marie Crandall  may return to work on return date  She may return on this date: 07/28/2022         If you have any questions or concerns, please don't hesitate to call        Marcin Echols PA-C    ______________________________           _______________          _______________  Hospital Representative                              Date                                Time

## (undated) DEVICE — ALLENTOWN LAP CHOLE APP PACK: Brand: CARDINAL HEALTH

## (undated) DEVICE — LIGAMAX 5 MM ENDOSCOPIC MULTIPLE CLIP APPLIER: Brand: LIGAMAX

## (undated) DEVICE — TAUT INTRODUCER KIT

## (undated) DEVICE — ENDOPOUCH RETRIEVER SPECIMEN RETRIEVAL BAGS: Brand: ENDOPOUCH RETRIEVER

## (undated) DEVICE — ENDOPATH XCEL UNIVERSAL TROCAR STABLILITY SLEEVES: Brand: ENDOPATH XCEL

## (undated) DEVICE — SYRINGE 20ML LL

## (undated) DEVICE — TROCARS: Brand: KII® BALLOON BLUNT TIP SYSTEM

## (undated) DEVICE — IRRIG ENDO FLO TUBING

## (undated) DEVICE — SCD SEQUENTIAL COMPRESSION COMFORT SLEEVE MEDIUM KNEE LENGTH: Brand: KENDALL SCD

## (undated) DEVICE — GLOVE SRG BIOGEL 7

## (undated) DEVICE — GLOVE INDICATOR PI UNDERGLOVE SZ 7.5 BLUE

## (undated) DEVICE — SUT VICRYL 0 UR-6 27 IN J603H

## (undated) DEVICE — 3M™ TEGADERM™ TRANSPARENT FILM DRESSING FRAME STYLE, 1622W, 1-3/4 IN X 1-3/4 IN (4.4 CM X 4.4 CM), 100/CT 4CT/CASE: Brand: 3M™ TEGADERM™

## (undated) DEVICE — BLUE HEAT SCOPE WARMER

## (undated) DEVICE — [HIGH FLOW INSUFFLATOR,  DO NOT USE IF PACKAGE IS DAMAGED,  KEEP DRY,  KEEP AWAY FROM SUNLIGHT,  PROTECT FROM HEAT AND RADIOACTIVE SOURCES.]: Brand: PNEUMOSURE

## (undated) DEVICE — ENDOPATH XCEL BLADELESS TROCARS WITH STABILITY SLEEVES: Brand: ENDOPATH XCEL

## (undated) DEVICE — ADHESIVE SKN CLSR HISTOACRYL FLEX 0.5ML LF

## (undated) DEVICE — 3000CC GUARDIAN II: Brand: GUARDIAN

## (undated) DEVICE — GAUZE,SPONGE,2"X2",8PLY,STERILE,LF,2'S: Brand: MEDLINE

## (undated) DEVICE — ENDOPATH 5MM CURVED SCISSORS WITH MONOPOLAR CAUTERY: Brand: ENDOPATH

## (undated) DEVICE — TAUT CATH INTRODUCER 4.5 FR

## (undated) DEVICE — SUT MONOCRYL 4-0 PS-2 27 IN Y426H

## (undated) DEVICE — CHLORAPREP HI-LITE 26ML ORANGE

## (undated) DEVICE — DRAPE C-ARM X-RAY

## (undated) DEVICE — HARMONIC ACE 5MM DIAMETER SHEARS 36CM SHAFT LENGTH + ADAPTIVE TISSUE TECHNOLOGY FOR USE WITH GENERATOR G11: Brand: HARMONIC ACE

## (undated) DEVICE — INTENDED FOR TISSUE SEPARATION, AND OTHER PROCEDURES THAT REQUIRE A SHARP SURGICAL BLADE TO PUNCTURE OR CUT.: Brand: BARD-PARKER SAFETY BLADES SIZE 11, STERILE

## (undated) DEVICE — 5 MM CURVED DISSECTORS WITH MONOPOLAR CAUTERY: Brand: ENDOPATH